# Patient Record
Sex: FEMALE | Race: WHITE | Employment: OTHER | URBAN - METROPOLITAN AREA
[De-identification: names, ages, dates, MRNs, and addresses within clinical notes are randomized per-mention and may not be internally consistent; named-entity substitution may affect disease eponyms.]

---

## 2019-12-27 ENCOUNTER — APPOINTMENT (OUTPATIENT)
Dept: CT IMAGING | Age: 84
End: 2019-12-27
Payer: MEDICARE

## 2019-12-27 ENCOUNTER — APPOINTMENT (OUTPATIENT)
Dept: GENERAL RADIOLOGY | Age: 84
End: 2019-12-27
Payer: MEDICARE

## 2019-12-27 ENCOUNTER — HOSPITAL ENCOUNTER (OUTPATIENT)
Age: 84
Setting detail: OBSERVATION
Discharge: NURSING, REHAB OR LONG-TERM CARE | End: 2019-12-30
Attending: EMERGENCY MEDICINE | Admitting: INTERNAL MEDICINE
Payer: MEDICARE
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
## 2019-12-27 LAB
ANION GAP SERPL CALCULATED.3IONS-SCNC: 14 MMOL/L (ref 3–16)
BASOPHILS ABSOLUTE: 0 K/UL (ref 0–0.2)
BASOPHILS RELATIVE PERCENT: 0.3 %
BUN BLDV-MCNC: 10 MG/DL (ref 7–20)
CALCIUM SERPL-MCNC: 9.1 MG/DL (ref 8.3–10.6)
CHLORIDE BLD-SCNC: 102 MMOL/L (ref 99–110)
CO2: 24 MMOL/L (ref 21–32)
CREAT SERPL-MCNC: <0.5 MG/DL (ref 0.6–1.2)
EOSINOPHILS ABSOLUTE: 0.1 K/UL (ref 0–0.6)
EOSINOPHILS RELATIVE PERCENT: 1.4 %
GFR AFRICAN AMERICAN: >60
GFR NON-AFRICAN AMERICAN: >60
GLUCOSE BLD-MCNC: 102 MG/DL (ref 70–99)
HCT VFR BLD CALC: 44.4 % (ref 36–48)
HEMOGLOBIN: 14.9 G/DL (ref 12–16)
LYMPHOCYTES ABSOLUTE: 1.6 K/UL (ref 1–5.1)
LYMPHOCYTES RELATIVE PERCENT: 16.5 %
MCH RBC QN AUTO: 34 PG (ref 26–34)
MCHC RBC AUTO-ENTMCNC: 33.7 G/DL (ref 31–36)
MCV RBC AUTO: 101 FL (ref 80–100)
MONOCYTES ABSOLUTE: 0.8 K/UL (ref 0–1.3)
MONOCYTES RELATIVE PERCENT: 8.7 %
NEUTROPHILS ABSOLUTE: 6.9 K/UL (ref 1.7–7.7)
NEUTROPHILS RELATIVE PERCENT: 73.1 %
PDW BLD-RTO: 13.3 % (ref 12.4–15.4)
PLATELET # BLD: 194 K/UL (ref 135–450)
PMV BLD AUTO: 8.4 FL (ref 5–10.5)
POTASSIUM REFLEX MAGNESIUM: 3.7 MMOL/L (ref 3.5–5.1)
RBC # BLD: 4.39 M/UL (ref 4–5.2)
SODIUM BLD-SCNC: 140 MMOL/L (ref 136–145)
TSH REFLEX: 1.09 UIU/ML (ref 0.27–4.2)
WBC # BLD: 9.4 K/UL (ref 4–11)

## 2019-12-27 PROCEDURE — 72131 CT LUMBAR SPINE W/O DYE: CPT

## 2019-12-27 PROCEDURE — 99283 EMERGENCY DEPT VISIT LOW MDM: CPT

## 2019-12-27 PROCEDURE — G0378 HOSPITAL OBSERVATION PER HR: HCPCS

## 2019-12-27 PROCEDURE — 72100 X-RAY EXAM L-S SPINE 2/3 VWS: CPT

## 2019-12-27 PROCEDURE — 72070 X-RAY EXAM THORAC SPINE 2VWS: CPT

## 2019-12-27 PROCEDURE — 6360000002 HC RX W HCPCS: Performed by: INTERNAL MEDICINE

## 2019-12-27 PROCEDURE — 85025 COMPLETE CBC W/AUTO DIFF WBC: CPT

## 2019-12-27 PROCEDURE — 80048 BASIC METABOLIC PNL TOTAL CA: CPT

## 2019-12-27 PROCEDURE — 6370000000 HC RX 637 (ALT 250 FOR IP): Performed by: INTERNAL MEDICINE

## 2019-12-27 PROCEDURE — 84443 ASSAY THYROID STIM HORMONE: CPT

## 2019-12-27 PROCEDURE — 6370000000 HC RX 637 (ALT 250 FOR IP): Performed by: EMERGENCY MEDICINE

## 2019-12-27 PROCEDURE — 72128 CT CHEST SPINE W/O DYE: CPT

## 2019-12-27 PROCEDURE — 96372 THER/PROPH/DIAG INJ SC/IM: CPT

## 2019-12-27 PROCEDURE — 2580000003 HC RX 258: Performed by: INTERNAL MEDICINE

## 2019-12-27 PROCEDURE — 36415 COLL VENOUS BLD VENIPUNCTURE: CPT

## 2019-12-27 RX ORDER — PRIMIDONE 250 MG/1
250 TABLET ORAL NIGHTLY
Status: DISCONTINUED | OUTPATIENT
Start: 2019-12-27 | End: 2019-12-30 | Stop reason: HOSPADM

## 2019-12-27 RX ORDER — VALACYCLOVIR HYDROCHLORIDE 1 G/1
1000 TABLET, FILM COATED ORAL DAILY
COMMUNITY

## 2019-12-27 RX ORDER — SIMVASTATIN 40 MG
80 TABLET ORAL NIGHTLY
Status: DISCONTINUED | OUTPATIENT
Start: 2019-12-27 | End: 2019-12-30 | Stop reason: HOSPADM

## 2019-12-27 RX ORDER — ONDANSETRON 2 MG/ML
4 INJECTION INTRAMUSCULAR; INTRAVENOUS EVERY 6 HOURS PRN
Status: DISCONTINUED | OUTPATIENT
Start: 2019-12-27 | End: 2019-12-30 | Stop reason: HOSPADM

## 2019-12-27 RX ORDER — ACETAMINOPHEN 325 MG/1
650 TABLET ORAL EVERY 6 HOURS
Status: DISCONTINUED | OUTPATIENT
Start: 2019-12-27 | End: 2019-12-30 | Stop reason: HOSPADM

## 2019-12-27 RX ORDER — CYCLOBENZAPRINE HCL 10 MG
5 TABLET ORAL 2 TIMES DAILY
Status: DISCONTINUED | OUTPATIENT
Start: 2019-12-27 | End: 2019-12-30 | Stop reason: HOSPADM

## 2019-12-27 RX ORDER — RAMIPRIL 5 MG/1
10 CAPSULE ORAL DAILY
Status: DISCONTINUED | OUTPATIENT
Start: 2019-12-27 | End: 2019-12-27

## 2019-12-27 RX ORDER — OXYCODONE HYDROCHLORIDE 5 MG/1
5 TABLET ORAL EVERY 4 HOURS PRN
Status: DISCONTINUED | OUTPATIENT
Start: 2019-12-27 | End: 2019-12-30 | Stop reason: HOSPADM

## 2019-12-27 RX ORDER — OXYCODONE HYDROCHLORIDE 5 MG/1
5 TABLET ORAL ONCE
Status: COMPLETED | OUTPATIENT
Start: 2019-12-27 | End: 2019-12-27

## 2019-12-27 RX ORDER — SODIUM CHLORIDE 0.9 % (FLUSH) 0.9 %
10 SYRINGE (ML) INJECTION PRN
Status: DISCONTINUED | OUTPATIENT
Start: 2019-12-27 | End: 2019-12-30 | Stop reason: HOSPADM

## 2019-12-27 RX ORDER — MAGNESIUM SULFATE IN WATER 40 MG/ML
2 INJECTION, SOLUTION INTRAVENOUS ONCE
Status: DISCONTINUED | OUTPATIENT
Start: 2019-12-27 | End: 2019-12-27

## 2019-12-27 RX ORDER — DOCUSATE SODIUM 100 MG/1
100 CAPSULE, LIQUID FILLED ORAL DAILY
COMMUNITY

## 2019-12-27 RX ORDER — DOCUSATE SODIUM 100 MG/1
100 CAPSULE, LIQUID FILLED ORAL DAILY
Status: DISCONTINUED | OUTPATIENT
Start: 2019-12-27 | End: 2019-12-30 | Stop reason: HOSPADM

## 2019-12-27 RX ORDER — AMLODIPINE BESYLATE 5 MG/1
5 TABLET ORAL NIGHTLY
Status: DISCONTINUED | OUTPATIENT
Start: 2019-12-27 | End: 2019-12-30

## 2019-12-27 RX ORDER — POTASSIUM CHLORIDE 1.5 G/1.77G
40 POWDER, FOR SOLUTION ORAL ONCE
Status: DISCONTINUED | OUTPATIENT
Start: 2019-12-27 | End: 2019-12-27

## 2019-12-27 RX ORDER — RAMIPRIL 5 MG/1
10 CAPSULE ORAL 2 TIMES DAILY
Status: DISCONTINUED | OUTPATIENT
Start: 2019-12-27 | End: 2019-12-30 | Stop reason: HOSPADM

## 2019-12-27 RX ORDER — ASPIRIN 81 MG/1
81 TABLET, CHEWABLE ORAL DAILY
Status: DISCONTINUED | OUTPATIENT
Start: 2019-12-27 | End: 2019-12-30 | Stop reason: HOSPADM

## 2019-12-27 RX ORDER — SODIUM CHLORIDE 0.9 % (FLUSH) 0.9 %
10 SYRINGE (ML) INJECTION EVERY 12 HOURS SCHEDULED
Status: DISCONTINUED | OUTPATIENT
Start: 2019-12-27 | End: 2019-12-30 | Stop reason: HOSPADM

## 2019-12-27 RX ORDER — VALACYCLOVIR HYDROCHLORIDE 500 MG/1
1000 TABLET, FILM COATED ORAL DAILY
Status: DISCONTINUED | OUTPATIENT
Start: 2019-12-27 | End: 2019-12-30 | Stop reason: HOSPADM

## 2019-12-27 RX ORDER — OMEGA-3S/DHA/EPA/FISH OIL/D3 300MG-1000
1000 CAPSULE ORAL DAILY
Status: DISCONTINUED | OUTPATIENT
Start: 2019-12-27 | End: 2019-12-30 | Stop reason: HOSPADM

## 2019-12-27 RX ORDER — HYDROCHLOROTHIAZIDE 25 MG/1
25 TABLET ORAL ONCE
Status: COMPLETED | OUTPATIENT
Start: 2019-12-27 | End: 2019-12-27

## 2019-12-27 RX ADMIN — DOCUSATE SODIUM 100 MG: 100 CAPSULE, LIQUID FILLED ORAL at 17:41

## 2019-12-27 RX ADMIN — ASPIRIN 81 MG 81 MG: 81 TABLET ORAL at 17:42

## 2019-12-27 RX ADMIN — OXYCODONE HYDROCHLORIDE 5 MG: 5 TABLET ORAL at 10:12

## 2019-12-27 RX ADMIN — ENOXAPARIN SODIUM 40 MG: 40 INJECTION SUBCUTANEOUS at 17:43

## 2019-12-27 RX ADMIN — CYCLOBENZAPRINE 5 MG: 10 TABLET, FILM COATED ORAL at 20:22

## 2019-12-27 RX ADMIN — HYDROCHLOROTHIAZIDE 25 MG: 25 TABLET ORAL at 13:56

## 2019-12-27 RX ADMIN — RAMIPRIL 10 MG: 5 CAPSULE ORAL at 20:22

## 2019-12-27 RX ADMIN — SIMVASTATIN 80 MG: 40 TABLET, FILM COATED ORAL at 20:22

## 2019-12-27 RX ADMIN — PRIMIDONE 250 MG: 250 TABLET ORAL at 20:22

## 2019-12-27 RX ADMIN — OXYCODONE 5 MG: 5 TABLET ORAL at 16:37

## 2019-12-27 RX ADMIN — CHOLECALCIFEROL (VITAMIN D3) 10 MCG (400 UNIT) TABLET 1000 UNITS: at 17:42

## 2019-12-27 RX ADMIN — AMLODIPINE BESYLATE 5 MG: 5 TABLET ORAL at 20:22

## 2019-12-27 RX ADMIN — Medication 10 ML: at 20:26

## 2019-12-27 RX ADMIN — VALACYCLOVIR HYDROCHLORIDE 1000 MG: 500 TABLET, FILM COATED ORAL at 17:42

## 2019-12-27 SDOH — HEALTH STABILITY: MENTAL HEALTH: HOW OFTEN DO YOU HAVE A DRINK CONTAINING ALCOHOL?: NEVER

## 2019-12-27 ASSESSMENT — ENCOUNTER SYMPTOMS
EYE ITCHING: 0
WHEEZING: 0
CHEST TIGHTNESS: 0
EYE DISCHARGE: 0
EYE PAIN: 0
BACK PAIN: 1
FACIAL SWELLING: 0
SORE THROAT: 0
RHINORRHEA: 0
VOMITING: 0
COUGH: 0
NAUSEA: 0
PHOTOPHOBIA: 0
DIARRHEA: 0
SHORTNESS OF BREATH: 0
ABDOMINAL PAIN: 0

## 2019-12-27 ASSESSMENT — PAIN DESCRIPTION - PAIN TYPE
TYPE: ACUTE PAIN
TYPE: ACUTE PAIN

## 2019-12-27 ASSESSMENT — PAIN SCALES - GENERAL
PAINLEVEL_OUTOF10: 0
PAINLEVEL_OUTOF10: 6
PAINLEVEL_OUTOF10: 8
PAINLEVEL_OUTOF10: 8
PAINLEVEL_OUTOF10: 0
PAINLEVEL_OUTOF10: 4

## 2019-12-27 ASSESSMENT — PAIN DESCRIPTION - LOCATION
LOCATION: BACK
LOCATION: BACK

## 2019-12-27 ASSESSMENT — PAIN DESCRIPTION - ORIENTATION
ORIENTATION: MID
ORIENTATION: MID

## 2019-12-27 NOTE — CONSULTS
NEUROSURGERY CONSULT NOTE    Ashley Nice  [id number]   1935 12/27/2019    Requesting physician: Ann Henson MD    Reason for consultation: L1 Fracture    History of present illness: Patient is a 82 y.o. female w/ PMH of HLD who presented on 12/27/2019 to [facility] ED c/o back pain. Patient states that 2 days ago she fell backwards and landed on her lower back. Patient had onset of pain initially but it was mild, and over the last few days pain has become more severe to the point where she is having difficulty walking. Denies numbness or tingling in her upper or lower extremities. Did not hit her head or lose consciousness. Takes a baby aspirin but otherwise no blood thinners. Patient rates her pain currently severe especially when trying to move at all. Lumbar XR showed L1 fracture. ROS:   GENERAL:  Denies fever or recent illness.  Denies weight changes   EYES:  Denies vision change or diplopia  EARS:  Denies hearing loss  CARDIAC:  Denies chest pain  RESPIRATORY:  Denies shortness of breath  SKIN:  Denies rash or lesions   HEM:  Denies excessive bruising  PSYCH:  Denies anxiety or depression  NEURO:  Denies headache, numbness or tingling or lateralizing weakness   :  Denies urinary difficulty  GI: Denies nausea, vomiting, diarrhea or constipation  MUSCULOSKELETAL: Endorses lumbar back pain    No Known Allergies    Past Medical History:   Diagnosis Date    Arthritis     Hyperlipidemia     IBS (irritable bowel syndrome)         Past Surgical History:   Procedure Laterality Date    BREAST LUMPECTOMY      CRANIOTOMY      MASTECTOMY         Social History     Occupational History    Not on file   Tobacco Use    Smoking status: Never Smoker    Smokeless tobacco: Never Used   Substance and Sexual Activity    Alcohol use: Never     Frequency: Never    Drug use: Never    Sexual activity: Not on file        Family History   Problem Relation Age of Onset    Cancer Other     Hypertension Mother  primidone (MYSOLINE) tablet 250 mg  250 mg Oral Nightly Krystina Mcneil MD        ramipril (ALTACE) capsule 10 mg  10 mg Oral Daily Krystina Mcneil MD        simvastatin (ZOCOR) tablet 80 mg  80 mg Oral Nightly Krystina Mcneil MD        vitamin D3 (CHOLECALCIFEROL) tablet 1,000 Units  1,000 Units Oral Daily Krystina Mcneil MD        potassium bicarb-citric acid (EFFER-K) effervescent tablet 40 mEq  40 mEq Oral Once Krystina Mcneil MD            Objective:  BP (!) 156/84   Pulse 83   Temp 99.2 °F (37.3 °C) (Oral)   Resp 18   Wt 155 lb (70.3 kg)   SpO2 93%   BMI 28.52 kg/m²     Physical Exam:   Patient seen and examined  GCS:  4 - Opens eyes on own  5 - Alert and oriented  6 - Follows simple motor commands  General: Well developed. Alert and cooperative in no acute distress. HENT: atraumatic, neck supple  Eyes: Optic discs: Not tested  Pulmonary: unlabored respiratory effort  Cardiovascular:  Warm well perfused. No peripheral edema  Gastrointestinal: abdomen soft, NT, ND    Neurological:  Mental Status: Awake, alert, oriented x 4, speech clear and appropriate  Attention: Intact  Language: No aphasia or dysarthria noted  Sensation: Intact to all extremities to light touch  Coordination: Intact    Cranial Nerves:  Cranial Nerves:  II: Visual acuity not tested, denies new visual changes / diplopia  III, IV, VI: PERRL, 3 mm bilaterally, EOMI, no nystagmus noted  V: Facial sensation intact bilaterally to touch  VII: Face symmetric  VIII: Hearing intact bilaterally to spoken voice  IX: Palate movement equal bilaterally  XI: Shoulder shrug equal bilaterally  XII: Tongue midline    Musculoskeletal:   Gait: Not tested   Assist devices: None   Tone: Normal  Motor strength:    Right  Left    Right  Left    Deltoid  5 5  Hip Flex  5 5   Biceps  5 5  Knee Extensors  5 5   Triceps  5 5  Knee Flexors  5 5   Wrist Ext  5 5  Ankle Dorsiflex. 5 5   Wrist Flex  5 5  Ankle Plantarflex.   5 5   Handgrip  5 5  Ext Benson Longus  5 5   Thumb Ext  5 5         Radiological Findings:  Xr Thoracic Spine (2 Views)  Result Date: 12/27/2019  Mild right convexity curvature centered at T12. Xr Lumbar Spine (2-3 Views)  Result Date: 12/27/2019  Age indeterminate compression deformity of L1. Labs:  Recent Labs     12/27/19  1531   WBC 9.4   HGB 14.9   HCT 44.4          Recent Labs     12/27/19  1531      K 3.7      CO2 24   BUN 10   CREATININE <0.5*   GLUCOSE 102*   CALCIUM 9.1       No results for input(s): PROTIME, INR, APTT in the last 72 hours. 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 female s/p fall 3 days ago w/L1 fracture. Plan:  1. No emergent neurosurgical intervention indicated  2. Neurologic exams frequency: Q4H  3. For change in exam MUST contact neurosurgery team along with critical care or primary team  4. CT Thoracic/Lumbar to better evaluate fracture  5. LSO Brace  - Brace to be worn when OOB ambulating or riding in car  - Brace does not need to be worn when in bed, chair, or bathing/showering  - Brace pads to be cleaned with soap & water, air dried, and changed as needed  6. Muscle spasms: PRN Skeletal Muscle Relaxant  7. Pain control: Managed by medical team  8. PT/OT consulted, appreciate recs  9. Advance diet / activity per primary team  10. Follow up w/Dr. Lori Mancuso in 6 weeks or sooner if pain does not improve  11. Thank you for consult. Will follow inpatient. Please call with any questions or decline in neurological status    DISPO: Dispo timing to be determined by primary team once patient is medically stable for discharge. Patient was discussed with Dr. Oscar Ng who agrees with above assessment and plan.      Electronically signed by: JONES Torres-CNP, 12/27/2019 4:18 PM  [phone]

## 2019-12-27 NOTE — CARE COORDINATION
Case Management Assessment           Initial Evaluation                Date / Time of Evaluation: 12/27/2019 2:39 PM                 Assessment Completed by: Eyad John    Patient Name: Trish Duffy     YOB: 1935  Diagnosis: No admission diagnoses are documented for this encounter. Date / Time: 12/27/2019  9:11 AM    Patient Admission Status: ED    If patient is discharged prior to next notation, then this note serves as note for discharge by case management. Current PCP: Shaquille Shelby MD  Clinic Patient: No    Chart Reviewed: Yes  Patient/ Family Interviewed: Yes    Initial assessment completed at bedside with: Patient, , sister    Hospitalization in the last 30 days: No    Emergency Contacts:  Extended Emergency Contact Information  Primary Emergency Contact: DwightRandy  Address: [address] [place] [address] Phone: [phone]  Mobile Phone: [phone]  Relation: Spouse    Advance Directives:   Code Status: No [address]: Yes    Copy present: No     In paper Chart: No    Scanned into EMR No    Financial  Payor: Ely George / Plan: [facility] SOLUTIONS / Product Type: *No Product type* /     Pre-cert required for SNF: Yes    Pharmacy    [facility] [address] [place] [address] [phone]  [facility] [address]  Phone: [phone] Fax: [phone]      Potential assistance Purchasing Medications:    Does Patient want to participate in local refill/ meds to beds program?:      Meds To Beds General Rules:  1. Can ONLY be done Monday- Friday between 8:30am-5pm  2. Prescription(s) must be in pharmacy by 3pm to be filled same day  3. Copy of patient's insurance/ prescription drug card and patient face sheet must be sent along with the prescription(s)  4. Cost of Rx cannot be added to hospital bill.  If financial After long discussion patient agreeable to SNF.  agreeable. He has been to [facility] in past.  They are interested in [facility] for SNF on d/c. Referral made. Patient will need precert. List of facilities in network with insurance and Advanced Quality list of facilities provided. No other needs at this time. The Plan for Transition of Care is related to the following treatment goals of No admission diagnoses are documented for this encounter. The Patient and/or patient representative Day Donato and her family were provided with a choice of provider and agrees with the discharge plan Yes    Freedom of choice list was provided with basic dialogue that supports the patient's individualized plan of care/goals and shares the quality data associated with the providers.  Yes      Care Transition patient: No  Electronically signed by ROSANNA Andrea, FABIO on 12/27/2019 at 2:39 PM

## 2019-12-27 NOTE — PROGRESS NOTES
Patient arrived to room 5510 from ED. Patient A&Ox4. VSS, with exception to an elevated BP. Neuro checks within normal limit, pt denies numbness or tingling, reports increasing back pain with movement. Patient and family oriented to room and instructed to call out for needs. Fall precautions in place. Will continue to monitor.

## 2019-12-27 NOTE — PROGRESS NOTES
Clinical Pharmacy Progress Note  Medication History     Admit Date: 12/27/2019    Subjective/Objective:  83 yo female admitted after a fall. Asked by Dr. Jason Villareal to clarify home medications. List of current medications patient is taking is complete. Home medication list in EPIC updated to reflect changes noted below. Source of information: patient, handwritten medication list, Della pharmacist     Changes made to medication list:     Medications added:   · Docusate 100mg daily   · Valacyclovir 1g PO daily - for shingles, patient states she has had shingles since March (?)  · OTC Eye vitamin BID    Medication doses adjusted:   · Changed ramipril from 10mg daily to 10mg BID     Please call with any questions.   Renata Sharma PharmD, [address] [place] [address] Pharmacy: [phone]  [address] [place] [address]: [phone]  12/27/2019 4:15 PM

## 2019-12-27 NOTE — H&P
Hospital Medicine History & Physical      PCP: Janneth Gonzalez MD    Date of Admission: 12/27/2019    Date of Service: Pt seen/examined on 12/27/2019  9:11 AM and    Placed in Observation. Chief Complaint: Fall and back pain    History Of Present Illness:    82 y.o. female with PMHx significant for attention hyperlipidemia admitted to the hospital after a fall and back pain  Patient states 2 days ago she missed a step and fell backwards and hit her back on the ground  She has had pain in the lower back since then  Initially it was mild but has gradually progressed to the point that is 10/10 worse with movement relieved with rest  She denies any tingling numbness of the lower extremities  Did not hit her head  No loss of consciousness  No chest pain shortness of breath  No bowel bladder complaints      Past Medical History:          Diagnosis Date    Arthritis     Hyperlipidemia     IBS (irritable bowel syndrome)        Past Surgical History:          Procedure Laterality Date    BREAST LUMPECTOMY      CRANIOTOMY      MASTECTOMY         Medications Prior to Admission:      Prior to Admission medications    Medication Sig Start Date End Date Taking? Authorizing Provider   ramipril (ALTACE) 10 MG capsule Take 10 mg by mouth daily. Yes Historical Provider, MD   amLODIPine (NORVASC) 5 MG tablet Take 5 mg by mouth nightly    Yes Historical Provider, MD   aspirin 81 MG tablet Take 81 mg by mouth daily. Yes Historical Provider, MD   primidone (MYSOLINE) 250 MG tablet Take 250 mg by mouth nightly    Yes Historical Provider, MD   simvastatin (ZOCOR) 80 MG tablet Take 80 mg by mouth nightly. Yes Historical Provider, MD   vitamin D (CHOLECALCIFEROL) 1000 UNIT TABS tablet Take 1,000 Units by mouth daily. Yes Historical Provider, MD       Allergies:  Patient has no known allergies. Social History:      The patient currently lives at home     TOBACCO:   reports that she has never smoked.  She has never used smokeless tobacco.  ETOH:   reports no history of alcohol use. Family History:      Reviewed in detail and negative for DM, CAD, Cancer, CVA. Positive as follows:        Problem Relation Age of Onset    Cancer Other     Hypertension Mother     Elevated Lipids Sister     Hypertension Sister     Osteoarthritis Sister        REVIEW OF SYSTEMS:   Pertinent positives as noted in the HPI. All other systems reviewed and negative. PHYSICAL EXAM:    BP (!) 174/97   Pulse 73   Temp 98.1 °F (36.7 °C) (Oral)   Resp 18   Wt 155 lb (70.3 kg)   SpO2 95%   BMI 28.52 kg/m²     General appearance:  No apparent distress, appears stated age and cooperative. HEENT:  Normal cephalic, atraumatic without obvious deformity. Pupils equal, round, and reactive to light. Extra ocular muscles intact. Conjunctivae/corneas clear. Neck: Supple, with full range of motion. No jugular venous distention. Trachea midline. Respiratory:  Normal respiratory effort. Clear to auscultation, bilaterally without RALES/WHEEZES/RHONCHI. Cardiovascular:  Regular rate and rhythm with normal S1/S2 without MURMUR, rubs or gallops. Abdomen: Soft, non-tender, non-distended with normal bowel sounds. Musculoskeletal:  No clubbing, cyanosis or EDEMA bilaterally. Full range of motion without deformity. Tenderness to palpation in the low back    Skin: Skin color, texture, turgor normal.  No rashes or lesions. Neurologic:  Neurovascularly intact without any focal sensory/motor deficits. Cranial nerves: II-XII intact, grossly non-focal.  Power 4/5 both lower extremities primary limited due to back pain  Sensations are intact    Psychiatric:  Alert and oriented, thought content appropriate, normal insight  Capillary Refill: Brisk,< 3 seconds   Peripheral Pulses: +2 palpable, equal bilaterally       Labs:     No results for input(s): WBC, HGB, HCT, PLT in the last 72 hours.   No results for input(s): NA, K, CL, CO2, BUN, CREATININE, CALCIUM, PHOS in the last 72 hours. Invalid input(s): MAGNES  No results for input(s): AST, ALT, BILIDIR, BILITOT, ALKPHOS in the last 72 hours. No results for input(s): INR in the last 72 hours. No results for input(s): Curry Cortez in the last 72 hours. Urinalysis:    No results found for: Sheyla Joseluis, BACTERIA, RBCUA, BLOODU, SPECGRAV, [address] [place] [address]    Radiology:         XR THORACIC SPINE (2 VIEWS)   Final Result      Age indeterminate compression deformity of L1. Recommend MRI for further evaluation as clinically indicated      Multilevel degenerative changes as described            Thoracic spine 3 views      HISTORY: Patient fell with mid back pain      Alignment anatomical. AP view shows mild right convexity curvature centered at T12. Vertebral height maintained and intervertebral disc spaces are preserved. Pedicles intact. No bone erosion or destruction and no paraspinal mass      IMPRESSION:      Mild right convexity curvature centered at T12. XR LUMBAR SPINE (2-3 VIEWS)   Final Result      Age indeterminate compression deformity of L1. Recommend MRI for further evaluation as clinically indicated      Multilevel degenerative changes as described            Thoracic spine 3 views      HISTORY: Patient fell with mid back pain      Alignment anatomical. AP view shows mild right convexity curvature centered at T12. Vertebral height maintained and intervertebral disc spaces are preserved. Pedicles intact.       No bone erosion or destruction and no paraspinal mass      IMPRESSION:      Mild right convexity curvature centered at T12.          ASSESSMENT/PLAN:    Active Hospital Problems    Diagnosis Date Noted    Fall against object [W18.00XA] 12/27/2019       Acute Medical Issues Being Addressed:    82-year-old lady admitted to the hospital after a fall    Fall mechanical secondary to age-related debility    Back pain secondary to L1 vertebral fracture  Currently no focal

## 2019-12-27 NOTE — PROGRESS NOTES
4 Eyes Admission Assessment     I agree as the admission nurse that 2 RN's have performed a thorough Head to Toe Skin Assessment on the patient. ALL assessment sites listed below have been assessed on admission. Areas assessed by both nurses:   [x]   Head, Face, and Ears   [x]   Shoulders, Back, and Chest  [x]   Arms, Elbows, and Hands   [x]   Coccyx, Sacrum, and Ischum  [x]   Legs, Feet, and Heels        Does the Patient have Skin Breakdown?   No         Artemio Prevention initiated:  No   Wound Care Orders initiated:  No      [facility] nurse consulted for Pressure Injury (Stage 3,4, Unstageable, DTI, NWPT, and Complex wounds):  No      Nurse 1 eSignature: Electronically signed by Vaibhav Herrera RN on 12/27/19 at 4:46 PM    **SHARE this note so that the co-signing nurse is able to place an eSignature**    Nurse 2 eSignature: Electronically signed by Cornell Holt RN on 12/27/19 at 5:15 PM

## 2019-12-28 LAB
A/G RATIO: 1.3 (ref 1.1–2.2)
ALBUMIN SERPL-MCNC: 3.6 G/DL (ref 3.4–5)
ALP BLD-CCNC: 79 U/L (ref 40–129)
ALT SERPL-CCNC: 33 U/L (ref 10–40)
ANION GAP SERPL CALCULATED.3IONS-SCNC: 13 MMOL/L (ref 3–16)
AST SERPL-CCNC: 26 U/L (ref 15–37)
BASOPHILS ABSOLUTE: 0 K/UL (ref 0–0.2)
BASOPHILS RELATIVE PERCENT: 0.4 %
BILIRUB SERPL-MCNC: 0.5 MG/DL (ref 0–1)
BILIRUBIN DIRECT: <0.2 MG/DL (ref 0–0.3)
BILIRUBIN, INDIRECT: ABNORMAL MG/DL (ref 0–1)
BUN BLDV-MCNC: 17 MG/DL (ref 7–20)
CALCIUM SERPL-MCNC: 9 MG/DL (ref 8.3–10.6)
CHLORIDE BLD-SCNC: 99 MMOL/L (ref 99–110)
CO2: 26 MMOL/L (ref 21–32)
CREAT SERPL-MCNC: 0.7 MG/DL (ref 0.6–1.2)
EOSINOPHILS ABSOLUTE: 0.2 K/UL (ref 0–0.6)
EOSINOPHILS RELATIVE PERCENT: 2 %
GFR AFRICAN AMERICAN: >60
GFR NON-AFRICAN AMERICAN: >60
GLOBULIN: 2.7 G/DL
GLUCOSE BLD-MCNC: 100 MG/DL (ref 70–99)
HCT VFR BLD CALC: 40.7 % (ref 36–48)
HEMOGLOBIN: 13.8 G/DL (ref 12–16)
INR BLD: 1.13 (ref 0.86–1.14)
LYMPHOCYTES ABSOLUTE: 2.3 K/UL (ref 1–5.1)
LYMPHOCYTES RELATIVE PERCENT: 24.1 %
MAGNESIUM: 2 MG/DL (ref 1.8–2.4)
MCH RBC QN AUTO: 34.1 PG (ref 26–34)
MCHC RBC AUTO-ENTMCNC: 33.9 G/DL (ref 31–36)
MCV RBC AUTO: 100.5 FL (ref 80–100)
MONOCYTES ABSOLUTE: 0.8 K/UL (ref 0–1.3)
MONOCYTES RELATIVE PERCENT: 8.8 %
NEUTROPHILS ABSOLUTE: 6.1 K/UL (ref 1.7–7.7)
NEUTROPHILS RELATIVE PERCENT: 64.7 %
PDW BLD-RTO: 13.3 % (ref 12.4–15.4)
PLATELET # BLD: 200 K/UL (ref 135–450)
PMV BLD AUTO: 8 FL (ref 5–10.5)
POTASSIUM REFLEX MAGNESIUM: 3.6 MMOL/L (ref 3.5–5.1)
PROTHROMBIN TIME: 13.1 SEC (ref 10–13.2)
RBC # BLD: 4.05 M/UL (ref 4–5.2)
SODIUM BLD-SCNC: 138 MMOL/L (ref 136–145)
TOTAL PROTEIN: 6.3 G/DL (ref 6.4–8.2)
VITAMIN D 25-HYDROXY: 52.6 NG/ML
WBC # BLD: 9.4 K/UL (ref 4–11)

## 2019-12-28 PROCEDURE — 36415 COLL VENOUS BLD VENIPUNCTURE: CPT

## 2019-12-28 PROCEDURE — 96372 THER/PROPH/DIAG INJ SC/IM: CPT

## 2019-12-28 PROCEDURE — 85025 COMPLETE CBC W/AUTO DIFF WBC: CPT

## 2019-12-28 PROCEDURE — G0378 HOSPITAL OBSERVATION PER HR: HCPCS

## 2019-12-28 PROCEDURE — 82306 VITAMIN D 25 HYDROXY: CPT

## 2019-12-28 PROCEDURE — 97530 THERAPEUTIC ACTIVITIES: CPT

## 2019-12-28 PROCEDURE — 80053 COMPREHEN METABOLIC PANEL: CPT

## 2019-12-28 PROCEDURE — 97116 GAIT TRAINING THERAPY: CPT

## 2019-12-28 PROCEDURE — 83735 ASSAY OF MAGNESIUM: CPT

## 2019-12-28 PROCEDURE — 85610 PROTHROMBIN TIME: CPT

## 2019-12-28 PROCEDURE — 97161 PT EVAL LOW COMPLEX 20 MIN: CPT

## 2019-12-28 PROCEDURE — 2580000003 HC RX 258: Performed by: INTERNAL MEDICINE

## 2019-12-28 PROCEDURE — 6360000002 HC RX W HCPCS: Performed by: INTERNAL MEDICINE

## 2019-12-28 PROCEDURE — 6370000000 HC RX 637 (ALT 250 FOR IP): Performed by: INTERNAL MEDICINE

## 2019-12-28 RX ADMIN — CYCLOBENZAPRINE 5 MG: 10 TABLET, FILM COATED ORAL at 08:26

## 2019-12-28 RX ADMIN — ASPIRIN 81 MG 81 MG: 81 TABLET ORAL at 08:26

## 2019-12-28 RX ADMIN — DOCUSATE SODIUM 100 MG: 100 CAPSULE, LIQUID FILLED ORAL at 08:26

## 2019-12-28 RX ADMIN — CHOLECALCIFEROL (VITAMIN D3) 10 MCG (400 UNIT) TABLET 1000 UNITS: at 08:26

## 2019-12-28 RX ADMIN — ENOXAPARIN SODIUM 40 MG: 40 INJECTION SUBCUTANEOUS at 08:28

## 2019-12-28 RX ADMIN — PRIMIDONE 250 MG: 250 TABLET ORAL at 20:03

## 2019-12-28 RX ADMIN — ACETAMINOPHEN 650 MG: 325 TABLET ORAL at 16:55

## 2019-12-28 RX ADMIN — Medication 10 ML: at 08:28

## 2019-12-28 RX ADMIN — RAMIPRIL 10 MG: 5 CAPSULE ORAL at 08:26

## 2019-12-28 RX ADMIN — POTASSIUM BICARBONATE 40 MEQ: 782 TABLET, EFFERVESCENT ORAL at 10:29

## 2019-12-28 RX ADMIN — ACETAMINOPHEN 650 MG: 325 TABLET ORAL at 22:29

## 2019-12-28 RX ADMIN — RAMIPRIL 10 MG: 5 CAPSULE ORAL at 20:03

## 2019-12-28 RX ADMIN — VALACYCLOVIR HYDROCHLORIDE 1000 MG: 500 TABLET, FILM COATED ORAL at 08:26

## 2019-12-28 RX ADMIN — AMLODIPINE BESYLATE 5 MG: 5 TABLET ORAL at 20:03

## 2019-12-28 RX ADMIN — SIMVASTATIN 80 MG: 40 TABLET, FILM COATED ORAL at 20:03

## 2019-12-28 RX ADMIN — CYCLOBENZAPRINE 5 MG: 10 TABLET, FILM COATED ORAL at 20:03

## 2019-12-28 RX ADMIN — Medication 10 ML: at 20:04

## 2019-12-28 RX ADMIN — OXYCODONE 5 MG: 5 TABLET ORAL at 20:03

## 2019-12-28 RX ADMIN — ACETAMINOPHEN 650 MG: 325 TABLET ORAL at 10:28

## 2019-12-28 ASSESSMENT — PAIN DESCRIPTION - ONSET
ONSET: ON-GOING

## 2019-12-28 ASSESSMENT — PAIN SCALES - GENERAL
PAINLEVEL_OUTOF10: 6
PAINLEVEL_OUTOF10: 2
PAINLEVEL_OUTOF10: 3
PAINLEVEL_OUTOF10: 6
PAINLEVEL_OUTOF10: 0
PAINLEVEL_OUTOF10: 2
PAINLEVEL_OUTOF10: 3
PAINLEVEL_OUTOF10: 0
PAINLEVEL_OUTOF10: 4

## 2019-12-28 ASSESSMENT — PAIN DESCRIPTION - PAIN TYPE
TYPE: ACUTE PAIN

## 2019-12-28 ASSESSMENT — PAIN - FUNCTIONAL ASSESSMENT
PAIN_FUNCTIONAL_ASSESSMENT: PREVENTS OR INTERFERES SOME ACTIVE ACTIVITIES AND ADLS
PAIN_FUNCTIONAL_ASSESSMENT: ACTIVITIES ARE NOT PREVENTED
PAIN_FUNCTIONAL_ASSESSMENT: PREVENTS OR INTERFERES SOME ACTIVE ACTIVITIES AND ADLS
PAIN_FUNCTIONAL_ASSESSMENT: ACTIVITIES ARE NOT PREVENTED

## 2019-12-28 ASSESSMENT — PAIN DESCRIPTION - LOCATION
LOCATION: BACK

## 2019-12-28 ASSESSMENT — PAIN DESCRIPTION - ORIENTATION
ORIENTATION: MID;LOWER
ORIENTATION: MID;LOWER

## 2019-12-28 ASSESSMENT — PAIN DESCRIPTION - PROGRESSION
CLINICAL_PROGRESSION: GRADUALLY WORSENING
CLINICAL_PROGRESSION: NOT CHANGED
CLINICAL_PROGRESSION: GRADUALLY WORSENING
CLINICAL_PROGRESSION: GRADUALLY WORSENING

## 2019-12-28 ASSESSMENT — PAIN DESCRIPTION - DESCRIPTORS
DESCRIPTORS: ACHING;SHARP;SORE
DESCRIPTORS: ACHING;DISCOMFORT
DESCRIPTORS: ACHING;SHARP
DESCRIPTORS: ACHING;DISCOMFORT

## 2019-12-28 ASSESSMENT — PAIN DESCRIPTION - FREQUENCY
FREQUENCY: INTERMITTENT

## 2019-12-28 NOTE — PROGRESS NOTES
Patient is alert and oriented. Vital signs are stable. Patient's pain is controlled with medication per MAR. Mayelin Ply is in place and has sufficient output. Bed is in the lowest position. Bed alarm is activated. Call light is within reach. Will continue to monitor and reassess.

## 2019-12-28 NOTE — PLAN OF CARE
Problem: Falls - Risk of:  Goal: Will remain free from falls  Description   12/28/2019 1055 by Chi Bennett  Outcome: Ongoing  Note:    Pt is a High fall risk. See Clementina Knutsona Fall Score and ABCDS Injury Risk assessments. Explained fall risk precautions to pt and family and rationale behind their use to keep the patient safe. Pt bed is in low position, side rails up, call light and belongings are in reach. Fall wristband applied and present on pts wrist.  Chair Alarm on. Pt encouraged to call for assistance. Will continue with hourly rounds for PO intake, pain needs, toileting and repositioning as needed. Problem: Pain:  Goal: Pain level will decrease  Description  Pain level will decrease  12/28/2019 1055 by Jesusita BOOTH  Outcome: Ongoing  Note:   Patient endorses minimal pain with ambulation, Tylenol administered as ordered (see eMar).

## 2019-12-28 NOTE — PROGRESS NOTES
last 72 hours.     Urinalysis:    No results found for: Jae Ramsay, BACTERIA, [address] [facility], [facility] [address], [place] [facility] [address]    Radiology:      Assessment/Plan:    Active Hospital Problems    Diagnosis Date Noted    Fall against object Veronica Warren 12/27/2019       Acute Medical Issues Being Addressed:    73-year-old lady admitted to the hospital after a fall    Fall mechanical secondary to age-related debility    L1 compression fracture  No focal findings on neurological exam of the lower extremities  Seen by neurosurgery  CT of the lumbar and thoracic spine noted  Vitamin D levels and TSH pending  Pain control with scheduled Tylenol, PRN tramadol and Flexeril twice a day  Awaiting physical and occupational therapy evaluation  Continue TLSO brace when mobilizing    Hypokalemia  We will replace    Hypertension  Continue hydrochlorothiazide and ramipril        DVT Prophylaxis:sc lovenox   Diet: DIET GENERAL;  Code Status: Full Code      Dispo - once acute medical processes have resolved    Geronimo Thomas MD

## 2019-12-28 NOTE — PROGRESS NOTES
Deviations: Slow Preethi;Decreased step length;Decreased step height  Distance: 10 feet  Comments: PT donned LSO for pt in sitting    Treatment:  Functional mobility training and pt education    Plan   Plan  Times per week: 2-5  Current Treatment Recommendations: Functional Mobility Training, Transfer Training, Gait Training, Safety Education & Training  Safety Devices  Type of devices: Left in chair, Call light within reach, Chair alarm in place, Nurse notified    Goals  Short term goals  Time Frame for Short term goals: by discharge  Short term goal 1: Bed mobility with SBA using log roll  Short term goal 2: Sit to stand with SBA  Short term goal 3: Pt will ambulate 50 feet with wheeled walker and CGA     Therapy Time   Individual Concurrent Group Co-treatment   Time In 0922         Time Out 1007         Minutes 45           Timed Code Treatment Minutes:   30    Total Treatment Minutes:  45     If pt d/c'd prior to next treatment, this note serves as a discharge note.   [place], [address] [facility] [place]

## 2019-12-29 LAB
ANION GAP SERPL CALCULATED.3IONS-SCNC: 11 MMOL/L (ref 3–16)
BUN BLDV-MCNC: 17 MG/DL (ref 7–20)
CALCIUM SERPL-MCNC: 8.7 MG/DL (ref 8.3–10.6)
CHLORIDE BLD-SCNC: 101 MMOL/L (ref 99–110)
CO2: 27 MMOL/L (ref 21–32)
CREAT SERPL-MCNC: 0.5 MG/DL (ref 0.6–1.2)
GFR AFRICAN AMERICAN: >60
GFR NON-AFRICAN AMERICAN: >60
GLUCOSE BLD-MCNC: 108 MG/DL (ref 70–99)
POTASSIUM SERPL-SCNC: 3.9 MMOL/L (ref 3.5–5.1)
SODIUM BLD-SCNC: 139 MMOL/L (ref 136–145)

## 2019-12-29 PROCEDURE — G0378 HOSPITAL OBSERVATION PER HR: HCPCS

## 2019-12-29 PROCEDURE — 80048 BASIC METABOLIC PNL TOTAL CA: CPT

## 2019-12-29 PROCEDURE — 6360000002 HC RX W HCPCS: Performed by: INTERNAL MEDICINE

## 2019-12-29 PROCEDURE — 96372 THER/PROPH/DIAG INJ SC/IM: CPT

## 2019-12-29 PROCEDURE — 6370000000 HC RX 637 (ALT 250 FOR IP): Performed by: INTERNAL MEDICINE

## 2019-12-29 PROCEDURE — 2580000003 HC RX 258: Performed by: INTERNAL MEDICINE

## 2019-12-29 PROCEDURE — 97166 OT EVAL MOD COMPLEX 45 MIN: CPT

## 2019-12-29 PROCEDURE — 97530 THERAPEUTIC ACTIVITIES: CPT

## 2019-12-29 PROCEDURE — 36415 COLL VENOUS BLD VENIPUNCTURE: CPT

## 2019-12-29 PROCEDURE — 97535 SELF CARE MNGMENT TRAINING: CPT

## 2019-12-29 RX ADMIN — ASPIRIN 81 MG 81 MG: 81 TABLET ORAL at 10:10

## 2019-12-29 RX ADMIN — Medication 10 ML: at 10:32

## 2019-12-29 RX ADMIN — ENOXAPARIN SODIUM 40 MG: 40 INJECTION SUBCUTANEOUS at 10:31

## 2019-12-29 RX ADMIN — ACETAMINOPHEN 650 MG: 325 TABLET ORAL at 16:42

## 2019-12-29 RX ADMIN — CYCLOBENZAPRINE 5 MG: 10 TABLET, FILM COATED ORAL at 21:06

## 2019-12-29 RX ADMIN — RAMIPRIL 10 MG: 5 CAPSULE ORAL at 21:05

## 2019-12-29 RX ADMIN — RAMIPRIL 10 MG: 5 CAPSULE ORAL at 10:09

## 2019-12-29 RX ADMIN — AMLODIPINE BESYLATE 5 MG: 5 TABLET ORAL at 21:07

## 2019-12-29 RX ADMIN — ACETAMINOPHEN 650 MG: 325 TABLET ORAL at 10:08

## 2019-12-29 RX ADMIN — CYCLOBENZAPRINE 5 MG: 10 TABLET, FILM COATED ORAL at 10:10

## 2019-12-29 RX ADMIN — VALACYCLOVIR HYDROCHLORIDE 1000 MG: 500 TABLET, FILM COATED ORAL at 10:10

## 2019-12-29 RX ADMIN — DOCUSATE SODIUM 100 MG: 100 CAPSULE, LIQUID FILLED ORAL at 10:10

## 2019-12-29 RX ADMIN — CHOLECALCIFEROL (VITAMIN D3) 10 MCG (400 UNIT) TABLET 1000 UNITS: at 10:09

## 2019-12-29 RX ADMIN — PRIMIDONE 250 MG: 250 TABLET ORAL at 21:31

## 2019-12-29 RX ADMIN — ACETAMINOPHEN 650 MG: 325 TABLET ORAL at 04:54

## 2019-12-29 RX ADMIN — SIMVASTATIN 80 MG: 40 TABLET, FILM COATED ORAL at 21:04

## 2019-12-29 ASSESSMENT — PAIN DESCRIPTION - DESCRIPTORS
DESCRIPTORS: ACHING
DESCRIPTORS: ACHING

## 2019-12-29 ASSESSMENT — PAIN DESCRIPTION - ONSET
ONSET: AWAKENED FROM SLEEP
ONSET: ON-GOING

## 2019-12-29 ASSESSMENT — PAIN DESCRIPTION - PROGRESSION
CLINICAL_PROGRESSION: GRADUALLY WORSENING
CLINICAL_PROGRESSION: GRADUALLY IMPROVING

## 2019-12-29 ASSESSMENT — PAIN SCALES - GENERAL
PAINLEVEL_OUTOF10: 6
PAINLEVEL_OUTOF10: 0
PAINLEVEL_OUTOF10: 3
PAINLEVEL_OUTOF10: 0
PAINLEVEL_OUTOF10: 5

## 2019-12-29 ASSESSMENT — PAIN DESCRIPTION - LOCATION
LOCATION: BACK

## 2019-12-29 ASSESSMENT — PAIN DESCRIPTION - PAIN TYPE
TYPE: ACUTE PAIN

## 2019-12-29 ASSESSMENT — PAIN DESCRIPTION - ORIENTATION
ORIENTATION: MID;LOWER
ORIENTATION: LOWER
ORIENTATION: LOWER

## 2019-12-29 ASSESSMENT — PAIN - FUNCTIONAL ASSESSMENT
PAIN_FUNCTIONAL_ASSESSMENT: PREVENTS OR INTERFERES SOME ACTIVE ACTIVITIES AND ADLS
PAIN_FUNCTIONAL_ASSESSMENT: ACTIVITIES ARE NOT PREVENTED

## 2019-12-29 ASSESSMENT — PAIN DESCRIPTION - FREQUENCY
FREQUENCY: INTERMITTENT
FREQUENCY: INTERMITTENT

## 2019-12-29 NOTE — PROGRESS NOTES
Patient is a/o x3, disoriented to time. Patient denies c/o nausea. Patient has c/o pain. VSS . Non-skid socks on, SCD'S remain in place. Patient x 1-2 stand/pivot w/gait belt and walker to [facility], and return to bed, tolerates fairly well. Fall precautions in place. Bed locked and in lowest position, bedside table and nurse call light within reach. Instructed patient to call out for assistance. Patient remains free from falls at this time, will continue to monitor.

## 2019-12-29 NOTE — PLAN OF CARE
Problem: Falls - Risk of:  Goal: Will remain free from falls  Description   Patient in bed with alarm and nonskid socks on, 3/4 side rails up and bed locked in lowest position. Call light, belongings, and bedside table within reach. Patient educated on using call light and instructed to call out for assistance when getting out of bed, patient verbalized understanding. Will continue to monitor. 12/28/2019 2139 by Fran Thakur RN  Outcome: Ongoing  Note:   Hourly rounding on patient for needs. Non-skid socks on, bed in lowest position and locked. Bedside table, personal belongs, and nurse call light within reach. Instructed patient to use call light for assistance. Bed alarm on. Floor clear of clutter. Patient remains free of falls at this time. Will continue to monitor. Problem: Pain:  Goal: Pain level will decrease  Description  Pain level will decrease  12/28/2019 2139 by Fran Thakur RN  Outcome: Ongoing  Note:   Patient c/o pain to mid/lower back. PRN pain medication given, upon reassessment, patient verbalized satisfaction. Will continue to monitor.

## 2019-12-29 NOTE — PROGRESS NOTES
SS enema given with moderate results. Pt tolerated well. Back to bed, alarm on, will cont to monitor.

## 2019-12-29 NOTE — PROGRESS NOTES
ambulating and back pain. +L1 fracture. Neurosurgery: non-operative, LSO for ambulation. PMHx includes IBS, HLD, arthritis, mastectomy, craniotomy  Family / Caregiver Present: No  Referring Practitioner: Rupard  Diagnosis: fall  Subjective  Subjective: Pt in bed on entry. Pleasant and cooperative with OT. Feels better today, less pain. \"It really doesn't even hurt today. \"   Pain Assessment  Pain Assessment: 0-10(no c/o pain)  Social/Functional History  Social/Functional History  Lives With: Spouse  Type of Home: House  Home Layout: Two level, Laundry in basement, Able to Live on Main level with bedroom/bathroom  Home Access: Stairs to enter with rails  Entrance Stairs - Number of Steps: 1+2, stair lift for basement stairs  Bathroom Shower/Tub: Walk-in shower(In basement)  Bathroom Toilet: Standard  Bathroom Equipment: Shower chair  Home Equipment: Rolling walker, Cane  ADL Assistance: Independent  Homemaking Assistance: Needs assistance(Goes out to eat often, pt does laundry, looking into cleaning person)  Ambulation Assistance: Independent(With cane)  Active : No( drives)  Occupation: Retired  Leisure & Hobbies: Enjoys [employer] for [employer]       Objective        Orientation  Overall Orientation Status: Within Functional Limits     Balance  Sitting Balance: Independent  Standing Balance: Contact guard assistance(static stance at walker)  Standing Balance  Time: 2 min + 5 min   Activity: functional mobility to/from bathroom for toileting and grooming  Comment: Min assist sit to stand from bed, CGA to min assist for gait with rolling walker - unsteady, Min assist toilet transfer with min cues and grab bar, CGA static stance. Toilet Transfers  Toilet - Technique: Ambulating  Equipment Used: Standard toilet(with grab bar)  Toilet Transfer: Minimal assistance(with min cues)  ADL  Feeding: Independent  Grooming: Independent  LE Dressing:  Moderate assistance  Toileting: Minimal assistance  Additional Comments: Min assist to don LSO, mod assist to doff LSO. Min cues to avoid excessive bending during ADLs - verb understanding. Bed mobility  Supine to Sit: Stand by assistance(HOB raised partially)  Scooting: Stand by assistance  Transfers  Sit to stand: Minimal assistance(min cues)  Stand to sit: Minimal assistance(min cues)     Cognition  Overall Cognitive Status: WFL(mild cognitive impairments - likely baseline)        Plan  If pt discharges prior to next tx, this note will serve as d/c summary. Continue per POC if pt does not d/c.     Plan  Times per week: 2-5x  Times per day: Daily  Current Treatment Recommendations: Strengthening, Balance Training, Self-Care / ADL, Equipment Evaluation, Education, & procurement, Patient/Caregiver Education & Training, Safety Education & Training, Endurance Training, Functional Mobility Training    AM-PAC Score  AM-PAC Inpatient Daily Activity Raw Score: 18 (12/29/19 0949)  AM-PAC Inpatient ADL T-Scale Score : 38.66 (12/29/19 0949)  ADL Inpatient CMS 0-100% Score: 46.65 (12/29/19 0949)  ADL Inpatient CMS G-Code Modifier : CK (12/29/19 0949)    Goals  Short term goals  Time Frame for Short term goals: by D/C  Short term goal 1:  Increase standing/mobility tolerance to 8 min - Not met  Short term goal 2: Transfer to/from chairs and commode with spvn - Not met  Short term goal 3: Complete toileting with spvn - Not met  Short term goal 4: Dress lower body with spvn, AE as needed - Not met  Short term goal 5: Don/doff LSO mod I - Not met  Patient Goals   Patient goals : to regain independence and get home soon       Therapy Time   Individual Concurrent Group Co-treatment   Time In 0909         Time Out 0948         Minutes 39          Timed Code Tx Min: 24  Total Tx Time: [facility], OT

## 2019-12-29 NOTE — PROGRESS NOTES
Hospitalist Progress Note      PCP: Mike Mon MD    Date of Admission: 12/27/2019    Chief Complaint: Back pain and fall    Hospital Course:   Patient feels well  Has not had a bowel movement for the last 3 to 4 days  Pain better controlled seems to be aggravated when she participates in physical therapy which is kind of expected  No chest pain shortness of breath  No tingling numbnessIn lower extremities          Medications:  Reviewed      Exam:    BP (!) 156/86   Pulse 58   Temp 98.6 °F (37 °C) (Oral)   Resp 16   Wt 155 lb (70.3 kg)   SpO2 94%   BMI 28.52 kg/m²     General appearance: No apparent distress, appears stated age and cooperative. HEENT: Pupils equal, round, and reactive to light. Conjunctivae/corneas clear. Neck: Supple, with full range of motion. No jugular venous distention. Trachea midline. Respiratory:  Normal respiratory effort. Clear to auscultation, bilaterally without RALES/WHEEZES/Rhonchi. Cardiovascular: Regular rate and rhythm with normal S1/S2 without MURMURS, rubs or gallops. Abdomen: Soft, non-tender, non-distended with normal bowel sounds. Musculoskeletal: No clubbing, cyanosis or EDEMA bilaterally. Full range of motion without deformity. Skin: Skin color, texture, turgor normal.  No rashes or lesions. Neurologic:  Neurovascularly intact without any focal sensory/motor deficits. Cranial nerves: II-XII intact, grossly non-focal.        Labs:   Recent Labs     12/27/19  1531 12/28/19  0731   WBC 9.4 9.4   HGB 14.9 13.8   HCT 44.4 40.7    200     Recent Labs     12/27/19  1531 12/28/19  0731 12/29/19  0640    138 139   K 3.7 3.6 3.9    99 101   CO2 24 26 27   BUN 10 17 17   CREATININE <0.5* 0.7 0.5*   CALCIUM 9.1 9.0 8.7     Recent Labs     12/28/19  0731   AST 26   ALT 33   BILIDIR <0.2   BILITOT 0.5   ALKPHOS 79     Recent Labs     12/28/19  0731   INR 1.13     No results for input(s): Luis Miguel Prayer in the last 72 hours.     Urinalysis:

## 2019-12-30 VITALS
DIASTOLIC BLOOD PRESSURE: 85 MMHG | RESPIRATION RATE: 16 BRPM | BODY MASS INDEX: 28.52 KG/M2 | SYSTOLIC BLOOD PRESSURE: 154 MMHG | OXYGEN SATURATION: 97 % | TEMPERATURE: 97.4 F | HEART RATE: 81 BPM | WEIGHT: 155 LBS

## 2019-12-30 PROCEDURE — 6370000000 HC RX 637 (ALT 250 FOR IP): Performed by: INTERNAL MEDICINE

## 2019-12-30 PROCEDURE — 6360000002 HC RX W HCPCS: Performed by: INTERNAL MEDICINE

## 2019-12-30 PROCEDURE — 2580000003 HC RX 258: Performed by: INTERNAL MEDICINE

## 2019-12-30 PROCEDURE — G0378 HOSPITAL OBSERVATION PER HR: HCPCS

## 2019-12-30 PROCEDURE — 97530 THERAPEUTIC ACTIVITIES: CPT

## 2019-12-30 PROCEDURE — 97535 SELF CARE MNGMENT TRAINING: CPT

## 2019-12-30 PROCEDURE — 96372 THER/PROPH/DIAG INJ SC/IM: CPT

## 2019-12-30 RX ORDER — AMLODIPINE BESYLATE 5 MG/1
5 TABLET ORAL ONCE
Status: COMPLETED | OUTPATIENT
Start: 2019-12-30 | End: 2019-12-30

## 2019-12-30 RX ORDER — CYCLOBENZAPRINE HCL 5 MG
5 TABLET ORAL 2 TIMES DAILY
Qty: 20 TABLET | Refills: 0 | Status: SHIPPED | OUTPATIENT
Start: 2019-12-30 | End: 2020-01-09

## 2019-12-30 RX ORDER — OXYCODONE HYDROCHLORIDE 5 MG/1
5 TABLET ORAL EVERY 6 HOURS PRN
Qty: 12 TABLET | Refills: 0 | Status: SHIPPED | OUTPATIENT
Start: 2019-12-30 | End: 2020-01-02

## 2019-12-30 RX ORDER — AMLODIPINE BESYLATE 10 MG/1
10 TABLET ORAL NIGHTLY
Qty: 30 TABLET | Refills: 3 | Status: SHIPPED | OUTPATIENT
Start: 2019-12-30

## 2019-12-30 RX ORDER — AMLODIPINE BESYLATE 10 MG/1
10 TABLET ORAL NIGHTLY
Status: DISCONTINUED | OUTPATIENT
Start: 2019-12-30 | End: 2019-12-30 | Stop reason: HOSPADM

## 2019-12-30 RX ADMIN — ENOXAPARIN SODIUM 40 MG: 40 INJECTION SUBCUTANEOUS at 09:17

## 2019-12-30 RX ADMIN — CHOLECALCIFEROL (VITAMIN D3) 10 MCG (400 UNIT) TABLET 1000 UNITS: at 09:16

## 2019-12-30 RX ADMIN — AMLODIPINE BESYLATE 5 MG: 5 TABLET ORAL at 12:09

## 2019-12-30 RX ADMIN — ACETAMINOPHEN 650 MG: 325 TABLET ORAL at 16:56

## 2019-12-30 RX ADMIN — MAGNESIUM HYDROXIDE 30 ML: 400 SUSPENSION ORAL at 09:17

## 2019-12-30 RX ADMIN — DOCUSATE SODIUM 100 MG: 100 CAPSULE, LIQUID FILLED ORAL at 09:17

## 2019-12-30 RX ADMIN — ASPIRIN 81 MG 81 MG: 81 TABLET ORAL at 09:17

## 2019-12-30 RX ADMIN — Medication 10 ML: at 12:11

## 2019-12-30 RX ADMIN — RAMIPRIL 10 MG: 5 CAPSULE ORAL at 04:40

## 2019-12-30 RX ADMIN — CYCLOBENZAPRINE 5 MG: 10 TABLET, FILM COATED ORAL at 09:17

## 2019-12-30 RX ADMIN — ACETAMINOPHEN 650 MG: 325 TABLET ORAL at 12:08

## 2019-12-30 RX ADMIN — VALACYCLOVIR HYDROCHLORIDE 1000 MG: 500 TABLET, FILM COATED ORAL at 09:17

## 2019-12-30 ASSESSMENT — PAIN SCALES - GENERAL
PAINLEVEL_OUTOF10: 8
PAINLEVEL_OUTOF10: 0

## 2019-12-30 ASSESSMENT — PAIN DESCRIPTION - PAIN TYPE: TYPE: ACUTE PAIN

## 2019-12-30 ASSESSMENT — PAIN DESCRIPTION - LOCATION: LOCATION: BACK

## 2019-12-30 ASSESSMENT — PAIN DESCRIPTION - DESCRIPTORS: DESCRIPTORS: ACHING

## 2019-12-30 NOTE — PROGRESS NOTES
Pt's family in room and was informed that pt is to be transferred to [address] [place] [address] at 6:30pm.

## 2019-12-30 NOTE — CARE COORDINATION
EDWIN spoke with Rossy West at [facility] and they will start precert today for possible admit tomorrow 12/31/19.      Electronically signed by ROSANNA Hughes, LUIS FELIPE on 12/30/2019 at 8:20 AM  [phone]

## 2019-12-30 NOTE — PROGRESS NOTES
Hospitalist Progress Note      PCP: Flor Jamil MD    Date of Admission: 12/27/2019    Chief Complaint: Back pain and fall    Hospital Course:   Patient feels well  Had 2 large bowel movements  No chest pain shortness of breath  Back pain better controlled  Had zoster on the right side of the abdomen still causes her some pain  No rash  No itching          Medications:  Reviewed      Exam:    BP (!) 178/94   Pulse 70   Temp 98.4 °F (36.9 °C) (Oral)   Resp 18   Wt 155 lb (70.3 kg)   SpO2 95%   BMI 28.52 kg/m²     General appearance: No apparent distress, appears stated age and cooperative. HEENT: Pupils equal, round, and reactive to light. Conjunctivae/corneas clear. Neck: Supple, with full range of motion. No jugular venous distention. Trachea midline. Respiratory:  Normal respiratory effort. Clear to auscultation, bilaterally without RALES/WHEEZES/Rhonchi. Cardiovascular: Regular rate and rhythm with normal S1/S2 without MURMURS, rubs or gallops. Abdomen: Soft, non-tender, non-distended with normal bowel sounds. Musculoskeletal: No clubbing, cyanosis or EDEMA bilaterally. Full range of motion without deformity. Skin: Skin color, texture, turgor normal.  No rashes or lesions. Neurologic:  Neurovascularly intact without any focal sensory/motor deficits. Cranial nerves: II-XII intact, grossly non-focal.    No rashes to suggest an active zoster infection    Labs:   Recent Labs     12/27/19  1531 12/28/19  0731   WBC 9.4 9.4   HGB 14.9 13.8   HCT 44.4 40.7    200     Recent Labs     12/27/19  1531 12/28/19  0731 12/29/19  0640    138 139   K 3.7 3.6 3.9    99 101   CO2 24 26 27   BUN 10 17 17   CREATININE <0.5* 0.7 0.5*   CALCIUM 9.1 9.0 8.7     Recent Labs     12/28/19  0731   AST 26   ALT 33   BILIDIR <0.2   BILITOT 0.5   ALKPHOS 79     Recent Labs     12/28/19  0731   INR 1.13     No results for input(s): Annie Stage in the last 72 hours.     Urinalysis:    No results found for: Brandt [address], [address] [facility] [address], Brandt [facility] [address]    Radiology:      Assessment/Plan:    Active Hospital Problems    Diagnosis Date Noted    Fall against object Chinedu Smart 12/27/2019       Acute Medical Issues Being Addressed:    79-year-old lady admitted to the hospital after a fall    Fall mechanical secondary to age-related debility    L1 compression fracture  No focal findings on neurological exam of the lower extremities  Seen by neurosurgery  CT of the lumbar and thoracic spine noted  Vitamin D levels and TSH pending  Pain control with scheduled Tylenol, PRN tramadol and Flexeril twice a day  Continue TLSO brace when mobilizing  And well-controlled  Will need placement and precertification     Hypokalemia  Replaced    Hypertension  Continue amlodipine and ramipril  Will increase dose of amlodipine    Constipation  Resolved        DVT Prophylaxis:sc lovenox   Diet: DIET GENERAL;  Code Status: Full Code      Dispo - once acute medical processes have resolved    Sierra Olivia MD

## 2019-12-30 NOTE — CARE COORDINATION
Yes    ADLS:  Current PT AM-PAC Score: 15 /24  Current OT AM-PAC Score: 18 /24      DISCHARGE Disposition: [facility] (SNF): [facility] Phone: [phone] Fax: [phone]    LOC at discharge: Skilled  Vinny Bergman Completed: Yes    Notification completed in HENS/PAS?:    HENS completed To [facility] SNF report #  [id number]. IMM Completed:   Not Indicated    Transportation:  Transportation PLAN for discharge: EMS transportation   Mode of Transport: Ambulance stretcher - BLS  Reason for medical transport: Bed confined: Meets the following criteria 1) unable to get out of bed without assistance or ambulate, 2) unable to safely sit up in a wheelchair, 3) unable to maintain erect seating position in a chair for time needed for transport  Name of Transport Company: [employer]  Phone: [phone]  Time of Transport: 6:30pm    Transport form completed: Yes    [address] and her family were provided with choice of provider; she and her family are in agreement with the discharge plan.     Care Transitions patient: No    ROSANNA French, [facility]  Case Management Department  Ph: [phone]  Fax: [phone]

## 2019-12-30 NOTE — DISCHARGE INSTR - COC
Continuity of Care Form    Patient Name: Mikayla Crow   :  1935  MRN:  [id number]    Admit date:  2019  Discharge date:  ***    Code Status Order: Full Code   Advance Directives:   [facility] Documentation     Date/Time Healthcare Directive Type of Healthcare Directive Copy in [address] Agent's Name Healthcare Agent's Phone Number    [phone]  Yes, patient has an advance directive for healthcare treatment  Living will  No, copy requested from family  --  --  --          Admitting Physician:  Ann Henson MD  PCP: Kenton Mendoza MD    Discharging Nurse: [facility] Unit/Room#: 6927/9983-80  Discharging Unit Phone Number: ***    Emergency Contact:   Extended Emergency Contact Information  Primary Emergency Contact: Randy Quintanilla  Address: [address] [place] [address] Phone: [phone]  Mobile Phone: [phone]  Relation: Spouse    Past Surgical History:  Past Surgical History:   Procedure Laterality Date    BREAST LUMPECTOMY      CRANIOTOMY      MASTECTOMY         Immunization History:   Immunization History   Administered Date(s) Administered    Influenza Vaccine, unspecified formulation 10/18/2015    Influenza Virus Vaccine 10/20/2016    Pneumococcal Conjugate Vaccine 2005    Zoster Live (Zostavax) 2005       Active Problems:  Patient Active Problem List   Diagnosis Code    Lymphoma malignant, lymphocytic ([facility] [address] C83.00    History of breast cancer Z85.3    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    Breast neoplasm, 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    Fall against object W18.00XA       Isolation/Infection:   Isolation          No Isolation        Patient Infection Status     None to display          Nurse Assessment:  Last Vital Signs: BP (!) 133/94   Pulse 81   Temp 98.5 °F (36.9 °C) (Oral)   Resp 16   Wt 155 lb (70.3 kg)   SpO2 95%   BMI 28.52 [facility]  · Address: [address] [place] [address], [address]  · Phone: [phone]  · Fax: [phone]        / signature: Electronically signed by ROSANNA Balderas, LSW on 12/30/19 at 3:13 PM    PHYSICIAN SECTION    Prognosis: Good    Condition at Discharge: Stable    Rehab Potential (if transferring to Rehab): Good    Recommended Labs or Other Treatments After Discharge:   Use brace when mobilising    Physician Certification: I certify the above information and transfer of [facility]  is necessary for the continuing treatment of the diagnosis listed and that she requires [facility] for greater 30 days.      Update Admission H&P: No change in H&P    PHYSICIAN SIGNATURE:  Electronically signed by Marco Valdovinos MD on 12/30/19 at 3:13 PM

## 2019-12-30 NOTE — PROGRESS NOTES
Occupational Therapy  Daily Treatment Note  Patient Name: Maria Esther Sylvester  MRN: [id number]     Assessment: Pt demos gradually improving activity tolerance. She continues to require assist to don her LSO, CGA for transfers and gait, and assist during ADLs. Plan is for [facility] SNF at d/c - pre-cert started today. Discharge Recommendations: Maria Esther Sylvester scored a 18/24 on the AM-PAC ADL Inpatient form. Current research shows that an AM-PAC score of 18 or greater is typically associated with a discharge to the patient's home setting. Based on the patients AM-PAC score and their current ADL deficits, it is recommended that the patient have 2-3 sessions per week of Occupational Therapy at d/c to increase the patients independence. See assessment. Equipment Needs:  No    Chart Reviewed: Yes     Other position/activity restrictions: Up as tolerated, LSO brace when OOB ambulating   Additional Pertinent Hx: Pt to [facility] on 12/27 s/p fall. Pt found to have L1 vertebral fx. H/o IBS, OA, hyperlipidemia, craniotomy, B mastectomy. Diagnosis: fall  Treatment Diagnosis: Decreased activity tolerance, impaired ADLs and mobility    Subjective: Pt in bed on entry. Tired, but pleasant and cooperative. Reports a bit more back pain intermittently, but feels stronger today. Pain: Yes, back pain, with certain movements but not at rest, left brace on in chair for comfort. Objective:    Cognition/Orientation: WFL during session with mild age related cognitive deficits    Bed mobility   Supine to sit: Spvn, HOB elevated    Functional Mobility   Sit to Stand: CGA, min cues  Stand to Sit: CGA, min cues (uncontrolled descent to chair)  Chair Transfer: CGA, min cues, walker  Commode Transfer: CGA, min cues, grab bar  Other: Functional mobility to/from and within bathroom with rolling walker for toileting and hand hygiene followed by ambulation in hallway with walker to increase activity tolerance. CGA throughout.  SBA for static stance at sink during hand hygiene. Time in stance: 1 min + 6 min     ADLs   Grooming: Independent (hand hygiene)  Toileting: CGA (min assist to reposition LSO after clothing management)  LSO: Min assist, min cues to don    Activity Tolerance: Fair, improving    Patient Education: Activity promotion, transfers, LSO, d/c planning - verb understanding    Safety Devices in Place: left in chair, alarm on, needs in reach, RN aware    Goals:  Short term goals  Time Frame for Short term goals: by D/C  Short term goal 1: Increase standing/mobility tolerance to 8 min - Not met  Short term goal 2: Transfer to/from chairs and commode with spvn - Not met  Short term goal 3: Complete toileting with spvn - Not met  Short term goal 4: Dress lower body with spvn, AE as needed - Not met  Short term goal 5: Don/doff LSO mod I - Not met         Plan:      Times per week: 2-5x   Times per day: Daily    If patient is discharged prior to next treatment, this note will serve as the discharge summary.     Therapy Time   Individual Concurrent Group Co-treatment   Time In 1020         Time Out 1050         Minutes 30           Timed Code Treatment Minutes: 30  Total Treatment Time: 30       Radha Hudson, OT

## 2019-12-31 NOTE — DISCHARGE SUMMARY
Physician Discharge Summary     Patient ID:  Yeni Newell  [id number]  82 y.o.  1935    Admit date: 12/27/2019    Discharge date and time: 12/30/2019  7:00 PM     Admitting Physician: Bc Grossman MD     Discharge Physician: Bc Grossman MD    Admission Diagnoses: Fall against object Brisa Clifford against object [W18.00XA]    Discharge Diagnoses:   Fall mechanical  Lumbar compression fracture    Admission Condition: good    Discharged Condition: good    Indication for Admission: back pain     Hospital Course:   82-year-old lady admitted to the hospital after a fall     Fall mechanical secondary to age-related debility     L1 compression fracture  And was complaining of back pain  She had a CT of the lumbar spine which showed a lumbar compression fractures  Vitamin D levels and TSH were normal  She was seen by neurosurgery  MRI of the lumbar spine also showed the L1 compression fracture  She was started on scheduled Tylenol PRN tramadol and Flexeril  Her pain was well controlled  She was given a TLSO brace      Hypokalemia  Replaced     Hypertension  Ramipril dose was increased      Discharge Exam:  BP (!) 154/85   Pulse 81   Temp 97.4 °F (36.3 °C) (Oral)   Resp 16   Wt 155 lb (70.3 kg)   SpO2 97%   BMI 28.52 kg/m²   General appearance: alert, appears stated age and cooperative  Lungs: clear to auscultation bilaterally  Heart: regular rate and rhythm, S1, S2 normal, no murmur, click, rub or gallop  Abdomen: soft, non-tender; bowel sounds normal; no masses,  no organomegaly  Neurologic: Grossly normal    Disposition: home    In process/preliminary results:  Outstanding Order Results     No orders found from 11/28/2019 to 12/28/2019. Patient Instructions:   Discharge Medication List as of 12/30/2019  6:27 PM      START taking these medications    Details   oxyCODONE (ROXICODONE) 5 MG immediate release tablet Take 1 tablet by mouth every 6 hours as needed for Pain for up to 3 days. , Disp-12 tablet, R-0Print      cyclobenzaprine (FLEXERIL) 5 MG tablet Take 1 tablet by mouth 2 times daily for 10 days, Disp-20 tablet, R-0Print         CONTINUE these medications which have CHANGED    Details   amLODIPine (NORVASC) 10 MG tablet Take 1 tablet by mouth nightly, Disp-30 tablet, R-3Print         CONTINUE these medications which have NOT CHANGED    Details   docusate sodium (COLACE) 100 MG capsule Take 100 mg by mouth dailyHistorical Med      Multiple Vitamins-Minerals (VISION VITAMINS PO) Take 1 tablet by mouth 2 times dailyHistorical Med      valACYclovir (VALTREX) 1 g tablet Take 1,000 mg by mouth dailyHistorical Med      ramipril (ALTACE) 10 MG capsule Take 10 mg by mouth 2 times daily Historical Med      aspirin 81 MG tablet Take 81 mg by mouth daily. primidone (MYSOLINE) 250 MG tablet Take 250 mg by mouth nightly Historical Med      simvastatin (ZOCOR) 80 MG tablet Take 80 mg by mouth nightly. vitamin D (CHOLECALCIFEROL) 1000 UNIT TABS tablet Take 1,000 Units by mouth daily.            Activity: activity as tolerated  Diet: cardiac diet  Wound Care: none needed  Time spent 40 mins  Signed:  Raliegh Essex MD  12/31/2019  3:12 PM
